# Patient Record
Sex: MALE | Race: WHITE | NOT HISPANIC OR LATINO | Employment: OTHER | URBAN - NONMETROPOLITAN AREA
[De-identification: names, ages, dates, MRNs, and addresses within clinical notes are randomized per-mention and may not be internally consistent; named-entity substitution may affect disease eponyms.]

---

## 2017-06-13 ENCOUNTER — OFFICE VISIT (OUTPATIENT)
Dept: PULMONOLOGY | Facility: CLINIC | Age: 42
End: 2017-06-13

## 2017-06-13 DIAGNOSIS — R06.02 SOB (SHORTNESS OF BREATH): Primary | ICD-10-CM

## 2017-06-13 PROCEDURE — 94060 EVALUATION OF WHEEZING: CPT | Performed by: INTERNAL MEDICINE

## 2018-03-27 ENCOUNTER — OFFICE VISIT (OUTPATIENT)
Dept: OTOLARYNGOLOGY | Facility: CLINIC | Age: 43
End: 2018-03-27

## 2018-03-27 ENCOUNTER — CONSULT (OUTPATIENT)
Dept: SLEEP MEDICINE | Facility: HOSPITAL | Age: 43
End: 2018-03-27

## 2018-03-27 VITALS — BODY MASS INDEX: 28.61 KG/M2 | HEIGHT: 72 IN | WEIGHT: 211.2 LBS

## 2018-03-27 VITALS
WEIGHT: 210 LBS | BODY MASS INDEX: 28.44 KG/M2 | OXYGEN SATURATION: 98 % | SYSTOLIC BLOOD PRESSURE: 122 MMHG | DIASTOLIC BLOOD PRESSURE: 72 MMHG | HEIGHT: 72 IN | HEART RATE: 69 BPM

## 2018-03-27 DIAGNOSIS — K21.9 GASTROESOPHAGEAL REFLUX DISEASE, ESOPHAGITIS PRESENCE NOT SPECIFIED: ICD-10-CM

## 2018-03-27 DIAGNOSIS — R07.0 THROAT PAIN IN ADULT: Primary | ICD-10-CM

## 2018-03-27 DIAGNOSIS — G47.9 SLEEP DISTURBANCE: ICD-10-CM

## 2018-03-27 DIAGNOSIS — G47.33 OBSTRUCTIVE SLEEP APNEA, ADULT: Primary | ICD-10-CM

## 2018-03-27 DIAGNOSIS — J31.0 CHRONIC RHINITIS, UNSPECIFIED TYPE: ICD-10-CM

## 2018-03-27 DIAGNOSIS — J37.0 CHRONIC LARYNGITIS: ICD-10-CM

## 2018-03-27 DIAGNOSIS — G47.9 SLEEP DISTURBANCE: Primary | ICD-10-CM

## 2018-03-27 PROCEDURE — 99243 OFF/OP CNSLTJ NEW/EST LOW 30: CPT | Performed by: OTOLARYNGOLOGY

## 2018-03-27 PROCEDURE — 31575 DIAGNOSTIC LARYNGOSCOPY: CPT | Performed by: OTOLARYNGOLOGY

## 2018-03-27 PROCEDURE — 99204 OFFICE O/P NEW MOD 45 MIN: CPT | Performed by: INTERNAL MEDICINE

## 2018-03-27 NOTE — PROGRESS NOTES
"New Patient Sleep Medicine Consultation    Encounter Date: 3/27/2018         Patient's PCP: AMILCAR Montoya  Referring provider: No ref. provider found  Reason for consultation: snoring, witnessed apneas and excessive daytime sleepiness    Kenneth Acuna is a 43 y.o. male who presents with above complaints for many years. He admits to daytime fatigue,snoring, HTN, decreased libido, irritability, memory loss, nocturia, sleepy driving, and non-restorative sleep. His bedtime is ~ 2200. He  falls asleep after 1 minutes, and is up 3-5 times per night. He wakes up ~ 0500. He drinks 1 cups of coffee, 32 teas, and 0 sodas per day. He drinks 0 alcoholic beverages per week. He does not smoke. He denies abnormal dreams, cataplexy, sleep paralysis, or hypnagogic hallucinations. He does not take sedatives or hypnotics. He has some sleepiness with driving. He naps when unstimulated. Restless legs symptoms are nightly    [place] - 19    Past Medical History:   Diagnosis Date   • High blood pressure        Social History     Social History   • Marital status:      Spouse name: N/A   • Number of children: N/A   • Years of education: N/A     Occupational History   • Not on file.     Social History Main Topics   • Smoking status: Never Smoker   • Smokeless tobacco: Never Used   • Alcohol use Not on file   • Drug use: Unknown   • Sexual activity: Not on file     Other Topics Concern   • Not on file     Social History Narrative   • No narrative on file     No family history on file.  , 2 kids, lays tile  Smoking history: smoked never  FH of sleep disorders: none    Review of Systems:  Pertinent items are noted in HPI. Patient advised to discuss any positive ROS with PCP.      Vitals:    03/27/18 1443   BP: 122/72   Pulse: 69   SpO2: 98%     Body mass index is 28.48 kg/m². Discussed the patient's BMI with him. BMI is within normal parameters. No follow-up required.  Neck circumference: 18\"            " General: Alert. Cooperative. Well developed. No acute distress.             Head:  Normocephalic. Symmetrical. Atraumatic.              Eyes: Sclera clear. No icterus. PERRLA. Normal EOM.             Ears: No deformities. Normal hearing.             Nose: No septal deviation. No drainage.          Throat: No oral lesions. No thrush. Moist mucous membranes.    Tongue is enlarged    Dentition is internally rotated teeth, overbite       Pharynx: Posterior pharyngeal pillars are narrow    Mallampati score of IV (only hard palate visible)    Pharynx is normal with unrermarkable tonsils   Chest Wall:  Normal shape. Symmetric expansion with respiration. No tenderness.             Neck:  Trachea midline.           Lungs:  Clear to auscultation bilaterally. No wheezes. No rhonchi. No rales. Respirations regular, even and unlabored.            Heart:  Regular rhythm and normal rate. Normal S1 and S2. No murmur.     Abdomen:  Soft, non-tender and non-distended. Normal bowel sounds. No masses.  Extremities:  Moves all extremities well. No edema.           Pulses: Pulses palpable and equal bilaterally.               Skin: Dry. Intact. No bleeding. No rash.           Neuro: Moves all 4 extremities and cranial nerves grossly intact.  Psychiatric: Normal mood and affect.        Current Outpatient Prescriptions:   •  Fluticasone Furoate-Vilanterol (BREO ELLIPTA) 200-25 MCG/INH aerosol powder , , Disp: , Rfl:   •  lisinopril (PRINIVIL,ZESTRIL) 5 MG tablet, , Disp: , Rfl:   •  tamsulosin (FLOMAX) 0.4 MG capsule 24 hr capsule, , Disp: , Rfl:   •  Testosterone 30 MG/ACT solution, , Disp: , Rfl:   •  valACYclovir (VALTREX) 500 MG tablet, , Disp: , Rfl:     ASSESSMENT:  1. Obstructive sleep apnea   1. Check HST  2. RTC in 3 months       This document has been electronically signed by Reyes Viveros MD on March 27, 2018         CC: AMILCAR Montoya          No ref. provider found

## 2018-03-29 NOTE — PROGRESS NOTES
"Subjective   Kenneth Acuna is a 43 y.o. male.   This is a consultation from Nel FITZGERALD  History of Present Illness   Patient is here today with throat complaints.  States that he has throat pain on a regular basis.  Says that he always feels like he is \"choked\".  Notes a raw/\"bloody\" taste in his throat all day long.  Clears his throat frequently.  States he has acid reflux symptoms on a daily basis.  Has been prescribed omeprazole but does not take this daily.  Also has snoring with possible sleep apnea and was supposed to be getting a sleep study but for reasons that are not entirely clear has not yet been scheduled.  Does not have a history of recurring throat infections or documented strep throat.  Symptoms of been going on for years.  He states frequently he will just keep working rather than go to the doctor when his throat pain flares up.  Also has nasal congestion and postnasal drainage for which he uses Flonase.  He is also on lisinopril for his blood pressure.      The following portions of the patient's history were reviewed and updated as appropriate: allergies, current medications, past family history, past medical history, past social history, past surgical history and problem list.      Kenneth Acuna reports that he has never smoked. He has never used smokeless tobacco.  Patient is not a tobacco user and has not been counseled for use of tobacco products    No family history on file.  Aid for bleeding disorder  No Known Allergies      Current Outpatient Prescriptions:   •  Fluticasone Furoate-Vilanterol (BREO ELLIPTA) 200-25 MCG/INH aerosol powder , , Disp: , Rfl:   •  lisinopril (PRINIVIL,ZESTRIL) 5 MG tablet, , Disp: , Rfl:   •  tamsulosin (FLOMAX) 0.4 MG capsule 24 hr capsule, , Disp: , Rfl:   •  Testosterone 30 MG/ACT solution, , Disp: , Rfl:   •  valACYclovir (VALTREX) 500 MG tablet, , Disp: , Rfl:     Past Medical History:   Diagnosis Date   • High blood pressure  "         Review of Systems   HENT: Positive for sore throat, trouble swallowing and voice change.    Eyes:        Vision change   Respiratory: Positive for cough and choking.    Gastrointestinal:        Heartburn   Genitourinary:        Nocturia; urinary incontinence   Neurological: Positive for weakness and headaches.   All other systems reviewed and are negative.          Objective   Physical Exam  General: Well-developed well-nourished male in no acute distress.  Alert and oriented ×3. Head: Normocephalic. Face: Symmetrical strength and appearance. PERRL. EOMI. Voice:Strong. Speech:Fluent  Ears: External ears no deformity, canals no discharge, tympanic membranes intact clear and mobile bilaterally.  Nose: Nares show no discharge mass polyp or purulence.  Boggy mucosa is present.  No gross external deformity.  Septum: Midline  Oral cavity: Lips and gums without lesions.  Tongue and floor of mouth without lesions.  Parotid and submandibular ducts unobstructed.  No mucosal lesions on the buccal mucosa or vestibule of the mouth.  Pharynx: No erythema exudate mass or ulcer.  Tonsils are not significantly enlarged, 1+ to at most 2+ in size.  Neck: No lymphadenopathy.  No thyromegaly.  Trachea and larynx midline.  No masses in the parotid or submandibular glands.    Procedure Note    Pre-operative Diagnosis: Patient presents with:  Sore Throat:  throat is raw; loses voice; throat pain       Post-operative Diagnosis: Chronic laryngitis    Anesthesia: topical with xylocaine and neosynephrine    Endoscopy Type:  Flexible Laryngoscopy    Procedure Details:    The patient was placed in the sitting position.  After topical anesthesia and decongestion, the 4 mm laryngoscope was passed.  The nasal cavities, nasopharynx, oropharynx, hypopharynx, and larynx were all examined.  Vocal cords were examined during respiration and phonation.  The following findings were noted:    Findings: Previously noted nasal findings were confirmed.  Nasopharynx without mass, hypopharynx and larynx without evidence of neoplasm. Vocal cord mobility intact. There is chronic appearing edema and erythema of the laryngeal structures consistent with chronic laryngitis.    Condition:  Stable.  Patient tolerated procedure well.    Complications:  None      Assessment/Plan   Kenneth was seen today for sore throat.    Diagnoses and all orders for this visit:    Throat pain in adult    Chronic laryngitis    Gastroesophageal reflux disease, esophagitis presence not specified    Sleep disturbance    Chronic rhinitis, unspecified type        Plan:: Explained to the patient that I believe he would benefit from daily treatment of his acid reflux, a change in his blood pressure medicine from lisinopril to a non-ACE inhibitor drug, and a consultation from the sleep lab.  He initially expresses displeasure that I will not just perform a tonsillectomy on him.  I explained to him that in my opinion tonsillectomy will not address the majority of his symptoms and that he would likely still have significant trouble with his throat after tonsillectomy.  I offered him the opportunity for another opinion from Dr. Campos or a different otolaryngologist elsewhere.  Eventually he agrees to the treatment plan including a referral to Dr. Viveros for further evaluation and treatment of his presumed sleep disorder.  He is to uses omeprazole daily, and discussed changing his blood pressure medicine with his primary care provider.  I'll reevaluate him in 2 months    My thanks to Ms. Bingham for this consultation

## 2018-03-30 ENCOUNTER — TELEPHONE (OUTPATIENT)
Dept: OTOLARYNGOLOGY | Facility: CLINIC | Age: 43
End: 2018-03-30

## 2018-03-30 RX ORDER — OMEPRAZOLE 20 MG/1
20 TABLET, DELAYED RELEASE ORAL DAILY
Qty: 30 TABLET | Refills: 0 | Status: SHIPPED | OUTPATIENT
Start: 2018-03-30

## 2018-03-30 NOTE — TELEPHONE ENCOUNTER
----- Message from Sandie Mcarthur sent at 3/30/2018  2:19 PM CDT -----  Contact: [phone]  THOUGHT DR SNYDER WAS GOING TO SEND AN RX TO [facility] FOR SOMETHING FOR REFLUX BUT NOTHING IS THERE.  I LOOKED AT CHART AND LOOKS LIKE HE MENTIONED PRILOSEC.  HE ASKED IF YOU COULD WRITE HIM A RX FOR THAT OR DOES HE NEED TO USE OTC.

## 2018-03-30 NOTE — TELEPHONE ENCOUNTER
Per Dr. Campos, Prilosec 20mg daily was sent to [facility]arm[facility].  Patient was asked to follow up with Dr. Laboy at his next scheduled appt.    (Dr. Campos covering)

## 2018-04-04 DIAGNOSIS — G47.33 OSA (OBSTRUCTIVE SLEEP APNEA): Primary | ICD-10-CM

## 2018-04-05 ENCOUNTER — HOSPITAL ENCOUNTER (OUTPATIENT)
Dept: SLEEP MEDICINE | Facility: HOSPITAL | Age: 43
Discharge: HOME | End: 2018-04-05
Admitting: INTERNAL MEDICINE

## 2018-04-05 DIAGNOSIS — G47.33 OSA (OBSTRUCTIVE SLEEP APNEA): ICD-10-CM

## 2018-04-05 PROCEDURE — 95806 SLEEP STUDY UNATT&RESP EFFT: CPT | Performed by: INTERNAL MEDICINE

## 2018-04-05 PROCEDURE — 95806 SLEEP STUDY UNATT&RESP EFFT: CPT

## 2018-04-17 ENCOUNTER — TELEPHONE (OUTPATIENT)
Dept: SLEEP MEDICINE | Facility: HOSPITAL | Age: 43
End: 2018-04-17

## 2018-04-17 NOTE — TELEPHONE ENCOUNTER
Patient called and given the results of his recent Home Sleep Study.  Pt stated that the results are inaccurate according to he and his wife.  He stated that his wife said he did not sleep or breathe like he normally does and he stated that he did not sleep much that night.  Appointment made with Dr Viveros to consider in lab sleep testing.

## 2019-03-27 ENCOUNTER — HOSPITAL ENCOUNTER (OUTPATIENT)
Dept: ULTRASOUND IMAGING | Facility: HOSPITAL | Age: 44
Discharge: HOME | End: 2019-03-27
Admitting: NURSE PRACTITIONER

## 2019-03-27 DIAGNOSIS — R10.9 ABDOMINAL DISCOMFORT: ICD-10-CM

## 2019-03-27 PROCEDURE — 76700 US EXAM ABDOM COMPLETE: CPT

## 2019-11-15 ENCOUNTER — OFFICE VISIT (OUTPATIENT)
Dept: GASTROENTEROLOGY | Facility: CLINIC | Age: 44
End: 2019-11-15

## 2019-11-15 VITALS
DIASTOLIC BLOOD PRESSURE: 78 MMHG | HEART RATE: 72 BPM | BODY MASS INDEX: 29.12 KG/M2 | WEIGHT: 208 LBS | HEIGHT: 71 IN | SYSTOLIC BLOOD PRESSURE: 126 MMHG

## 2019-11-15 DIAGNOSIS — R19.4 CHANGE IN BOWEL HABITS: ICD-10-CM

## 2019-11-15 DIAGNOSIS — R10.32 LEFT LOWER QUADRANT ABDOMINAL PAIN: ICD-10-CM

## 2019-11-15 DIAGNOSIS — R63.4 WEIGHT LOSS, ABNORMAL: Primary | ICD-10-CM

## 2019-11-15 PROCEDURE — 99204 OFFICE O/P NEW MOD 45 MIN: CPT | Performed by: INTERNAL MEDICINE

## 2019-11-15 RX ORDER — DEXTROSE AND SODIUM CHLORIDE 5; .45 G/100ML; G/100ML
30 INJECTION, SOLUTION INTRAVENOUS CONTINUOUS PRN
Status: CANCELLED | OUTPATIENT
Start: 2019-11-20

## 2019-11-15 RX ORDER — NAPROXEN 500 MG/1
500 TABLET ORAL EVERY 12 HOURS PRN
Refills: 0 | COMMUNITY
Start: 2019-10-16

## 2019-11-15 RX ORDER — VALACYCLOVIR HYDROCHLORIDE 1 G/1
TABLET, FILM COATED ORAL AS NEEDED
Refills: 0 | COMMUNITY
Start: 2019-08-15

## 2019-11-15 RX ORDER — ATORVASTATIN CALCIUM 20 MG/1
20 TABLET, FILM COATED ORAL DAILY
Refills: 0 | COMMUNITY
Start: 2019-10-16

## 2019-11-15 RX ORDER — SODIUM, POTASSIUM,MAG SULFATES 17.5-3.13G
1 SOLUTION, RECONSTITUTED, ORAL ORAL EVERY 12 HOURS
Qty: 1 BOTTLE | Refills: 0 | Status: ON HOLD | OUTPATIENT
Start: 2019-11-15 | End: 2019-11-20

## 2019-11-15 RX ORDER — QUETIAPINE FUMARATE 25 MG/1
25 TABLET, FILM COATED ORAL
Refills: 1 | COMMUNITY
Start: 2019-10-23

## 2019-11-15 RX ORDER — PANTOPRAZOLE SODIUM 40 MG/1
40 TABLET, DELAYED RELEASE ORAL DAILY
Refills: 0 | COMMUNITY
Start: 2019-10-18

## 2019-11-15 NOTE — PROGRESS NOTES
[facility]      Chief Complaint:   Chief Complaint   Patient presents with   • Rectal Bleeding     Blood in stool, weight loss       Subjective     HPI:   Patient with significant weight loss over the past 6 months.  Patient has some changes in his bowel habits with thinning of the stool.  Patient states he does get some diffuse abdominal pain mostly in the lower regions of his abdomen.  Patient does not know his family history because he was adopted.  Patient denies any nausea or vomiting.    Plan; we will schedule patient for EGD and colonoscopy to evaluate.  EGD to evaluate small bowel with the biopsy of the small bowel.    Past Medical History:   Past Medical History:   Diagnosis Date   • High blood pressure        Past Surgical History:  Past Surgical History:   Procedure Laterality Date   • WISDOM TOOTH EXTRACTION         Family History:  History reviewed. No pertinent family history.    Social History:   reports that he has never smoked. He has never used smokeless tobacco. He reports that he drinks alcohol.    Medications:   Prior to Admission medications    Medication Sig Start Date End Date Taking? Authorizing Provider   atorvastatin (LIPITOR) 20 MG tablet Take 20 mg by mouth Daily. 10/16/19  Yes Provider, MD Carlton   Fluticasone Furoate-Vilanterol (BREO ELLIPTA) 200-25 MCG/INH aerosol powder   9/7/17  Yes Emergency, Nurse Bharati RN   lisinopril (PRINIVIL,ZESTRIL) 5 MG tablet  7/11/17  Yes Emergency, Nurse Calabrese RN   pantoprazole (PROTONIX) 40 MG EC tablet Take 40 mg by mouth Daily. 10/18/19  Yes Provider, MD Carlton   QUEtiapine (SEROquel) 25 MG tablet Take 25 mg by mouth every night at bedtime. 10/23/19  Yes Provider, MD Carlton   tamsulosin (FLOMAX) 0.4 MG capsule 24 hr capsule  9/5/17  Yes Emergency, Nurse Bharati RN   Testosterone 30 MG/ACT solution  7/17/17  Yes Emergency, Nurse Epic, RN   naproxen (NAPROSYN) 500 MG tablet Take 500 mg by mouth Every 12 (Twelve)  "Hours As Needed. 10/16/19   ProviderCarlton MD   omeprazole OTC (PriLOSEC OTC) 20 MG EC tablet Take 1 tablet by mouth Daily. 3/30/18   Dandre Campos MD   sodium-potassium-magnesium sulfates (SUPREP) 17.5-3.13-1.6 GM/177ML solution oral solution Take 1 bottle by mouth Every 12 (Twelve) Hours. 11/15/19   Mayank Chi MD   valACYclovir (VALTREX) 1000 MG tablet As Needed. 8/15/19   ProviderCarlton MD   valACYclovir (VALTREX) 500 MG tablet  7/16/17   Emergency, Nurse Bharati, RN       Allergies:  Patient has no known allergies.    ROS:    Review of Systems   Constitutional: Negative for activity change, appetite change and unexpected weight change.   HENT: Negative for congestion, sore throat and trouble swallowing.    Respiratory: Negative for cough, choking and shortness of breath.    Cardiovascular: Negative for chest pain.   Gastrointestinal: Positive for abdominal pain and diarrhea. Negative for abdominal distention, anal bleeding, blood in stool, constipation, nausea, rectal pain and vomiting.   Endocrine: Negative for heat intolerance, polydipsia and polyphagia.   Genitourinary: Negative for difficulty urinating.   Musculoskeletal: Negative for arthralgias.   Skin: Negative for color change, pallor, rash and wound.   Allergic/Immunologic: Negative for food allergies.   Neurological: Negative for dizziness, syncope, weakness and headaches.   Psychiatric/Behavioral: Negative for agitation, behavioral problems, confusion and decreased concentration.     Objective     Blood pressure 126/78, pulse 72, height 180.3 cm (71\"), weight 94.3 kg (208 lb).    Physical Exam   Constitutional: He is oriented to person, place, and time. He appears well-developed and well-nourished. No distress.   HENT:   Head: Normocephalic and atraumatic.   Cardiovascular: Normal rate, regular rhythm, normal heart sounds and intact distal pulses. Exam reveals no gallop and no friction rub.   No murmur heard.  Pulmonary/Chest: " Breath sounds normal. No respiratory distress. He has no wheezes. He has no rales. He exhibits no tenderness.   Abdominal: Soft. Bowel sounds are normal. He exhibits no distension and no mass. There is no tenderness. There is no rebound and no guarding. No hernia.   Musculoskeletal: Normal range of motion. He exhibits no edema.   Neurological: He is alert and oriented to person, place, and time.   Skin: Skin is warm and dry. No rash noted. He is not diaphoretic. No erythema. No pallor.   Psychiatric: He has a normal mood and affect. His behavior is normal. Judgment and thought content normal.        Assessment/Plan   Kenneth was seen today for rectal bleeding.    Diagnoses and all orders for this visit:    Weight loss, abnormal  -     Case Request; Standing  -     dextrose 5 % and sodium chloride 0.45 % infusion  -     Case Request    Change in bowel habits  -     Case Request; Standing  -     dextrose 5 % and sodium chloride 0.45 % infusion  -     Case Request    Left lower quadrant abdominal pain  -     Case Request; Standing  -     dextrose 5 % and sodium chloride 0.45 % infusion  -     Case Request    Other orders  -     Follow Anesthesia Guidelines / Standing Orders; Future  -     Obtain Informed Consent; Future  -     Implement Anesthesia Orders Day of Procedure; Standing  -     Obtain Informed Consent; Standing  -     POC Glucose Once; Standing  -     Insert Peripheral IV; Standing  -     sodium-potassium-magnesium sulfates (SUPREP) 17.5-3.13-1.6 GM/177ML solution oral solution; Take 1 bottle by mouth Every 12 (Twelve) Hours.        ESOPHAGOGASTRODUODENOSCOPY (N/A), COLONOSCOPY (N/A)     Diagnosis Plan   1. Weight loss, abnormal  Case Request    dextrose 5 % and sodium chloride 0.45 % infusion    Case Request   2. Change in bowel habits  Case Request    dextrose 5 % and sodium chloride 0.45 % infusion    Case Request   3. Left lower quadrant abdominal pain  Case Request    dextrose 5 % and sodium chloride  0.45 % infusion    Case Request       Anticipated Surgical Procedure:  Orders Placed This Encounter   Procedures   • Follow Anesthesia Guidelines / Standing Orders     Standing Status:   Future   • Obtain Informed Consent     Standing Status:   Future     Order Specific Question:   Informed Consent Given For     Answer:   egd and colonoscopy       The risks, benefits, and alternatives of this procedure have been discussed with the patient or the responsible party- the patient understands and agrees to proceed.

## 2019-11-15 NOTE — PATIENT INSTRUCTIONS

## 2019-11-20 ENCOUNTER — ANESTHESIA (OUTPATIENT)
Dept: GASTROENTEROLOGY | Facility: HOSPITAL | Age: 44
End: 2019-11-20

## 2019-11-20 ENCOUNTER — HOSPITAL ENCOUNTER (OUTPATIENT)
Facility: HOSPITAL | Age: 44
Setting detail: HOSPITAL OUTPATIENT SURGERY
Discharge: HOME | End: 2019-11-20
Attending: INTERNAL MEDICINE | Admitting: INTERNAL MEDICINE

## 2019-11-20 ENCOUNTER — ANESTHESIA EVENT (OUTPATIENT)
Dept: GASTROENTEROLOGY | Facility: HOSPITAL | Age: 44
End: 2019-11-20

## 2019-11-20 VITALS
SYSTOLIC BLOOD PRESSURE: 110 MMHG | OXYGEN SATURATION: 98 % | HEART RATE: 54 BPM | DIASTOLIC BLOOD PRESSURE: 64 MMHG | HEIGHT: 71 IN | BODY MASS INDEX: 27.86 KG/M2 | RESPIRATION RATE: 18 BRPM | WEIGHT: 199 LBS | TEMPERATURE: 96.9 F

## 2019-11-20 DIAGNOSIS — R19.4 CHANGE IN BOWEL HABITS: ICD-10-CM

## 2019-11-20 DIAGNOSIS — R10.32 LEFT LOWER QUADRANT ABDOMINAL PAIN: ICD-10-CM

## 2019-11-20 DIAGNOSIS — R63.4 WEIGHT LOSS, ABNORMAL: ICD-10-CM

## 2019-11-20 PROCEDURE — 45380 COLONOSCOPY AND BIOPSY: CPT | Performed by: INTERNAL MEDICINE

## 2019-11-20 PROCEDURE — 43239 EGD BIOPSY SINGLE/MULTIPLE: CPT | Performed by: INTERNAL MEDICINE

## 2019-11-20 PROCEDURE — 88305 TISSUE EXAM BY PATHOLOGIST: CPT | Performed by: PATHOLOGY

## 2019-11-20 PROCEDURE — 88305 TISSUE EXAM BY PATHOLOGIST: CPT | Performed by: INTERNAL MEDICINE

## 2019-11-20 PROCEDURE — 25010000002 PROPOFOL 10 MG/ML EMULSION: Performed by: NURSE ANESTHETIST, CERTIFIED REGISTERED

## 2019-11-20 RX ORDER — LIDOCAINE HYDROCHLORIDE 20 MG/ML
INJECTION, SOLUTION INTRAVENOUS AS NEEDED
Status: DISCONTINUED | OUTPATIENT
Start: 2019-11-20 | End: 2019-11-20 | Stop reason: SURG

## 2019-11-20 RX ORDER — PROPOFOL 10 MG/ML
VIAL (ML) INTRAVENOUS AS NEEDED
Status: DISCONTINUED | OUTPATIENT
Start: 2019-11-20 | End: 2019-11-20 | Stop reason: SURG

## 2019-11-20 RX ORDER — DEXTROSE AND SODIUM CHLORIDE 5; .45 G/100ML; G/100ML
30 INJECTION, SOLUTION INTRAVENOUS CONTINUOUS PRN
Status: DISCONTINUED | OUTPATIENT
Start: 2019-11-20 | End: 2019-11-20 | Stop reason: HOSPADM

## 2019-11-20 RX ADMIN — LIDOCAINE HYDROCHLORIDE 100 MG: 20 INJECTION, SOLUTION INTRAVENOUS at 15:22

## 2019-11-20 RX ADMIN — PROPOFOL 20 MG: 10 INJECTION, EMULSION INTRAVENOUS at 15:25

## 2019-11-20 RX ADMIN — PROPOFOL 20 MG: 10 INJECTION, EMULSION INTRAVENOUS at 15:26

## 2019-11-20 RX ADMIN — PROPOFOL 30 MG: 10 INJECTION, EMULSION INTRAVENOUS at 15:29

## 2019-11-20 RX ADMIN — PROPOFOL 30 MG: 10 INJECTION, EMULSION INTRAVENOUS at 15:24

## 2019-11-20 RX ADMIN — PROPOFOL 20 MG: 10 INJECTION, EMULSION INTRAVENOUS at 15:31

## 2019-11-20 RX ADMIN — PROPOFOL 100 MG: 10 INJECTION, EMULSION INTRAVENOUS at 15:23

## 2019-11-20 RX ADMIN — DEXTROSE AND SODIUM CHLORIDE 30 ML/HR: 5; 450 INJECTION, SOLUTION INTRAVENOUS at 14:47

## 2019-11-20 NOTE — ANESTHESIA PREPROCEDURE EVALUATION
Anesthesia Evaluation     Patient summary reviewed   NPO Solid Status: > 8 hours  NPO Liquid Status: > 8 hours           Airway   Mallampati: III  TM distance: <3 FB  Neck ROM: full  Possible difficult intubation  Dental      Pulmonary - normal exam   Cardiovascular - normal exam    (+) hypertension,       Neuro/Psych  GI/Hepatic/Renal/Endo      Musculoskeletal     Abdominal    Substance History      OB/GYN          Other                        Anesthesia Plan    ASA 2     MAC     intravenous induction     Anesthetic plan, all risks, benefits, and alternatives have been provided, discussed and informed consent has been obtained with: patient.

## 2019-11-20 NOTE — ANESTHESIA POSTPROCEDURE EVALUATION
Patient: Kenneth Acuna    Procedure Summary     Date:  11/20/19 Room / Location:  [facility] ENDOSCOPY 1 / [facility] ENDOSCOPY    Anesthesia Start:  1518 Anesthesia Stop:  1534    Procedures:       ESOPHAGOGASTRODUODENOSCOPY (N/A )      COLONOSCOPY (N/A ) Diagnosis:       Weight loss, abnormal      Change in bowel habits      Left lower quadrant abdominal pain      (Weight loss, abnormal [R63.4])      (Change in bowel habits [R19.4])      (Left lower quadrant abdominal pain [R10.32])    Surgeon:  Mayank Chi MD Provider:  Miguelito Heath CRNA    Anesthesia Type:  MAC ASA Status:  2          Anesthesia Type: MAC  Last vitals  BP   167/80 (11/20/19 1431)   Temp   97.6 °F (36.4 °C) (11/20/19 1431)   Pulse   63 (11/20/19 1431)   Resp   18 (11/20/19 1431)     SpO2   99 % (11/20/19 1431)     Post Anesthesia Care and Evaluation    Patient location during evaluation: bedside  Patient participation: complete - patient participated  Level of consciousness: awake  Pain score: 0  Pain management: adequate  Airway patency: patent  Anesthetic complications: No anesthetic complications  PONV Status: none  Cardiovascular status: acceptable  Respiratory status: acceptable  Hydration status: acceptable

## 2019-11-22 LAB
LAB AP CASE REPORT: NORMAL
PATH REPORT.FINAL DX SPEC: NORMAL
PATH REPORT.GROSS SPEC: NORMAL

## 2019-12-10 ENCOUNTER — OFFICE VISIT (OUTPATIENT)
Dept: GASTROENTEROLOGY | Facility: CLINIC | Age: 44
End: 2019-12-10

## 2019-12-10 VITALS
HEIGHT: 71 IN | WEIGHT: 205.8 LBS | HEART RATE: 63 BPM | OXYGEN SATURATION: 99 % | BODY MASS INDEX: 28.81 KG/M2 | SYSTOLIC BLOOD PRESSURE: 130 MMHG | DIASTOLIC BLOOD PRESSURE: 80 MMHG

## 2019-12-10 DIAGNOSIS — R19.4 CHANGE IN BOWEL HABITS: ICD-10-CM

## 2019-12-10 DIAGNOSIS — R63.4 WEIGHT LOSS, ABNORMAL: Primary | ICD-10-CM

## 2019-12-10 PROCEDURE — 99213 OFFICE O/P EST LOW 20 MIN: CPT | Performed by: INTERNAL MEDICINE

## 2019-12-10 NOTE — PROGRESS NOTES
[facility] ([facility]      Chief Complaint:   Chief Complaint   Patient presents with   • Weight loss, abnormal       Subjective     HPI:   Patient complains of abnormal weight loss.  Patient had EGD and colonoscopy which only showed mild gastritis otherwise normal.  Patient states that since the procedure he is actually gained about 6 pounds.  Biopsies of small bowel were normal.  Discussed with patient that I do not find any reason for his weight loss at this time and most likely is just a normal fluctuation in his weight.  We will follow-up in 3 months to make sure he does not continue to have weight loss.  No further studies needed at this time.    Plan; we will have patient follow-up in 3 months    Past Medical History:   Past Medical History:   Diagnosis Date   • High blood pressure        Past Surgical History:  Past Surgical History:   Procedure Laterality Date   • COLONOSCOPY N/A 11/20/2019    Procedure: COLONOSCOPY;  Surgeon: Mayank Chi MD;  Location: [facility] ENDOSCOPY;  Service: Gastroenterology   • ENDOSCOPY N/A 11/20/2019    Procedure: ESOPHAGOGASTRODUODENOSCOPY;  Surgeon: Mayank Chi MD;  Location: [facility] ENDOSCOPY;  Service: Gastroenterology   • WISDOM TOOTH EXTRACTION         Family History:  History reviewed. No pertinent family history.    Social History:   reports that he has never smoked. He has never used smokeless tobacco. He reports that he drinks alcohol.    Medications:   Prior to Admission medications    Medication Sig Start Date End Date Taking? Authorizing Provider   atorvastatin (LIPITOR) 20 MG tablet Take 20 mg by mouth Daily. 10/16/19  Yes Provider, MD Carlton   Fluticasone Furoate-Vilanterol (BREO ELLIPTA) 200-25 MCG/INH aerosol powder   9/7/17  Yes Emergency, Nurse Bharati RN   lisinopril (PRINIVIL,ZESTRIL) 5 MG tablet  7/11/17  Yes Emergency, Nurse Epic, RN   naproxen (NAPROSYN) 500 MG tablet Take 500 mg by mouth Every 12 (Twelve) Hours As Needed. 10/16/19   "Yes Provider, MD Carlton   omeprazole OTC (PriLOSEC OTC) 20 MG EC tablet Take 1 tablet by mouth Daily. 3/30/18  Yes Dandre Campos MD   pantoprazole (PROTONIX) 40 MG EC tablet Take 40 mg by mouth Daily. 10/18/19  Yes Carlton Sahni MD   QUEtiapine (SEROquel) 25 MG tablet Take 25 mg by mouth every night at bedtime. 10/23/19  Yes Carlton Sahni MD   tamsulosin (FLOMAX) 0.4 MG capsule 24 hr capsule  9/5/17  Yes Emergency, Nurse Bharati, RN   Testosterone 30 MG/ACT solution  7/17/17  Yes Emergency, Nurse Bharati, RN   valACYclovir (VALTREX) 1000 MG tablet As Needed. 8/15/19  Yes Carlton Sahni MD   valACYclovir (VALTREX) 500 MG tablet  7/16/17  Yes Emergency, Nurse Bharati, RN       Allergies:  Patient has no known allergies.    ROS:    Review of Systems   Constitutional: Negative for activity change, appetite change and unexpected weight change.   HENT: Negative for congestion, sore throat and trouble swallowing.    Respiratory: Negative for cough, choking and shortness of breath.    Cardiovascular: Negative for chest pain.   Gastrointestinal: Negative for abdominal distention, abdominal pain, anal bleeding, blood in stool, constipation, diarrhea, nausea, rectal pain and vomiting.   Endocrine: Negative for heat intolerance, polydipsia and polyphagia.   Genitourinary: Negative for difficulty urinating.   Musculoskeletal: Negative for arthralgias.   Skin: Negative for color change, pallor, rash and wound.   Allergic/Immunologic: Negative for food allergies.   Neurological: Negative for dizziness, syncope, weakness and headaches.   Psychiatric/Behavioral: Negative for agitation, behavioral problems, confusion and decreased concentration.     Objective     Blood pressure 130/80, pulse 63, height 180.3 cm (71\"), weight 93.4 kg (205 lb 12.8 oz), SpO2 99 %.    Physical Exam   Constitutional: He is oriented to person, place, and time. He appears well-developed and well-nourished. No distress.   HENT:   Head: " Normocephalic and atraumatic.   Cardiovascular: Normal rate, regular rhythm, normal heart sounds and intact distal pulses. Exam reveals no gallop and no friction rub.   No murmur heard.  Pulmonary/Chest: Breath sounds normal. No respiratory distress. He has no wheezes. He has no rales. He exhibits no tenderness.   Abdominal: Soft. Bowel sounds are normal. He exhibits no distension and no mass. There is no tenderness. There is no rebound and no guarding. No hernia.   Musculoskeletal: Normal range of motion. He exhibits no edema.   Neurological: He is alert and oriented to person, place, and time.   Skin: Skin is warm and dry. No rash noted. He is not diaphoretic. No erythema. No pallor.   Psychiatric: He has a normal mood and affect. His behavior is normal. Judgment and thought content normal.        Assessment/Plan   Kenneth was seen today for weight loss, abnormal.    Diagnoses and all orders for this visit:    Weight loss, abnormal    Change in bowel habits        * Surgery not found *     Diagnosis Plan   1. Weight loss, abnormal     2. Change in bowel habits         Anticipated Surgical Procedure:  No orders of the defined types were placed in this encounter.      The risks, benefits, and alternatives of this procedure have been discussed with the patient or the responsible party- the patient understands and agrees to proceed.

## 2019-12-10 NOTE — PATIENT INSTRUCTIONS

## 2020-10-23 ENCOUNTER — OFFICE VISIT (OUTPATIENT)
Dept: ORTHOPEDIC SURGERY | Facility: CLINIC | Age: 45
End: 2020-10-23

## 2020-10-23 ENCOUNTER — LAB (OUTPATIENT)
Dept: LAB | Facility: HOSPITAL | Age: 45
End: 2020-10-23

## 2020-10-23 VITALS
TEMPERATURE: 98.2 F | OXYGEN SATURATION: 96 % | DIASTOLIC BLOOD PRESSURE: 93 MMHG | HEIGHT: 72 IN | SYSTOLIC BLOOD PRESSURE: 135 MMHG | BODY MASS INDEX: 23.3 KG/M2 | WEIGHT: 172 LBS | HEART RATE: 72 BPM

## 2020-10-23 DIAGNOSIS — L03.116 CELLULITIS OF LEFT KNEE: ICD-10-CM

## 2020-10-23 DIAGNOSIS — M25.561 CHRONIC PAIN OF RIGHT KNEE: Primary | ICD-10-CM

## 2020-10-23 DIAGNOSIS — G89.29 CHRONIC PAIN OF LEFT KNEE: Primary | ICD-10-CM

## 2020-10-23 DIAGNOSIS — G89.29 CHRONIC PAIN OF RIGHT KNEE: Primary | ICD-10-CM

## 2020-10-23 DIAGNOSIS — M25.562 CHRONIC PAIN OF LEFT KNEE: Primary | ICD-10-CM

## 2020-10-23 PROCEDURE — 87186 SC STD MICRODIL/AGAR DIL: CPT | Performed by: ORTHOPAEDIC SURGERY

## 2020-10-23 PROCEDURE — 99203 OFFICE O/P NEW LOW 30 MIN: CPT | Performed by: ORTHOPAEDIC SURGERY

## 2020-10-23 PROCEDURE — 87205 SMEAR GRAM STAIN: CPT | Performed by: ORTHOPAEDIC SURGERY

## 2020-10-23 PROCEDURE — 20610 DRAIN/INJ JOINT/BURSA W/O US: CPT | Performed by: ORTHOPAEDIC SURGERY

## 2020-10-23 PROCEDURE — 87147 CULTURE TYPE IMMUNOLOGIC: CPT | Performed by: ORTHOPAEDIC SURGERY

## 2020-10-23 PROCEDURE — 87070 CULTURE OTHR SPECIMN AEROBIC: CPT | Performed by: ORTHOPAEDIC SURGERY

## 2020-10-23 RX ORDER — HYDROCODONE BITARTRATE AND ACETAMINOPHEN 7.5; 325 MG/1; MG/1
TABLET ORAL
Qty: 30 TABLET | Refills: 0 | Status: SHIPPED | OUTPATIENT
Start: 2020-10-23 | End: 2020-10-26

## 2020-10-23 RX ORDER — CIPROFLOXACIN 500 MG/1
500 TABLET, FILM COATED ORAL EVERY 12 HOURS SCHEDULED
Status: SHIPPED | OUTPATIENT
Start: 2020-10-23 | End: 2020-10-28

## 2020-10-23 NOTE — PROGRESS NOTES
Kenneth Acuna is a 45 y.o. male   Primary provider:  Siobhan Ortiz MD       Chief Complaint   Patient presents with   • Left Knee - Knee Pain       HISTORY OF PRESENT ILLNESS:  Left knee pain started 10/15/2020 was seen at [place] on 10/18/2020 was started on IV for cellulitis.     This is the first office visit for evaluation of left knee pain.    Mr. Hannah is 45 years old.  He is considered to be a fair historian.  He said that about a week ago he had what he thought was a pimple on his left knee.  He said he tried to squeeze the pimple.  He subsequently developed pain and swelling.  He was seen in the [place] and by his history he was started on IV antibiotics 5 days ago.  His last dose of antibiotics was yesterday.  He has continued moderate pain.  The pain is fairly diffuse worse over the anterior aspect of the knee.  There is been no drainage.  Is unclear if he has had problems with his knee in the past.  He said that in the past he has drained his knee himself.  He said his son is a free bleeder and he apparently uses needles that are for his son's use.  Complains of moderately severe pain not controlled with over-the-counter medications.    Home medications include Breo Ellipta lisinopril Naprosyn omeprazole Protonix Seroquel Flomax testosterone and Lipitor.  He has no drug allergies.  He does not smoke.  Past medical history is remarkable for diabetes and hypertension.  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  He is .      Knee Pain   There was no injury mechanism. The pain is present in the left knee. The quality of the pain is described as stabbing. The pain is at a severity of 10/10. The pain is severe. The pain has been constant since onset. Associated symptoms comments: REDNESS, BRUISING, SWELLING. . He reports no foreign bodies present. The symptoms are aggravated by weight bearing (STANDING, SITTING, DRIVING, AND WALKING. ). He has tried rest for the symptoms.        CONCURRENT  MEDICAL HISTORY:    Past Medical History:   Diagnosis Date   • High blood pressure        No Known Allergies      Current Outpatient Medications:   •  atorvastatin (LIPITOR) 20 MG tablet, Take 20 mg by mouth Daily., Disp: , Rfl: 0  •  Fluticasone Furoate-Vilanterol (BREO ELLIPTA) 200-25 MCG/INH aerosol powder , , Disp: , Rfl:   •  lisinopril (PRINIVIL,ZESTRIL) 5 MG tablet, , Disp: , Rfl:   •  naproxen (NAPROSYN) 500 MG tablet, Take 500 mg by mouth Every 12 (Twelve) Hours As Needed., Disp: , Rfl: 0  •  omeprazole OTC (PriLOSEC OTC) 20 MG EC tablet, Take 1 tablet by mouth Daily., Disp: 30 tablet, Rfl: 0  •  pantoprazole (PROTONIX) 40 MG EC tablet, Take 40 mg by mouth Daily., Disp: , Rfl: 0  •  QUEtiapine (SEROquel) 25 MG tablet, Take 25 mg by mouth every night at bedtime., Disp: , Rfl: 1  •  tamsulosin (FLOMAX) 0.4 MG capsule 24 hr capsule, , Disp: , Rfl:   •  Testosterone 30 MG/ACT solution, , Disp: , Rfl:   •  valACYclovir (VALTREX) 1000 MG tablet, As Needed., Disp: , Rfl: 0  •  valACYclovir (VALTREX) 500 MG tablet, , Disp: , Rfl:   •  HYDROcodone-acetaminophen (NORCO) 7.5-325 MG per tablet, 1-2 by mouth 3 times a day as needed for severe pain, Disp: 30 tablet, Rfl: 0    Current Facility-Administered Medications:   •  ciprofloxacin (CIPRO) tablet 500 mg, 500 mg, Oral, Q12H, Chuck Medrano MD    Past Surgical History:   Procedure Laterality Date   • COLONOSCOPY N/A 11/20/2019    Procedure: COLONOSCOPY;  Surgeon: Mayank Chi MD;  Location: [facility] ENDOSCOPY;  Service: Gastroenterology   • ENDOSCOPY N/A 11/20/2019    Procedure: ESOPHAGOGASTRODUODENOSCOPY;  Surgeon: Mayank Chi MD;  Location: [facility] ENDOSCOPY;  Service: Gastroenterology   • WISDOM TOOTH EXTRACTION         History reviewed. No pertinent family history.    Social History     Socioeconomic History   • Marital status:      Spouse name: Not on file   • Number of children: Not on file   • Years of education: Not on file   • Highest  "education level: Not on file   Tobacco Use   • Smoking status: Never Smoker   • Smokeless tobacco: Never Used   Substance and Sexual Activity   • Alcohol use: Yes     Comment: occasionally        Review of Systems   Musculoskeletal:        Stiffness.    Psychiatric/Behavioral: The patient is nervous/anxious.    All other systems reviewed and are negative.    reView of systems is positive as noted above.  PHYSICAL EXAMINATION:       Vitals:    10/23/20 1014   BP: 135/93   Pulse: 72   Temp: 98.2 °F (36.8 °C)   SpO2: 96%   Weight: 78 kg (172 lb)   Height: 182.9 cm (72\")   PainSc: 10-Worst pain ever       Physical Exam general he is alert and in apparent mild to moderate discomfort at rest.  He is occasionally almost agitated when describing his pain.    GAIT:     []  Normal  [x]  Antalgic    Assistive device: []  None  []  Walker     []  Crutches  []  Cane     [x]  Wheelchair  []  Stretcher    Ortho Exam walks with a moderately antalgic gait favoring the left.  There is fullness over the anterosuperior aspect of the knee with erythema primarily over the kim medial l aspect of the knee and distal thigh.  There is palpable fluid subcutaneous tissues over the anterolateral aspect of the knee.  Motion of the knee is quite restricted with complaints of pain.  He will allow motion from about 20 to 90 degrees.  Dorsalis pedis pulses strong.  Sensory exam is intact to soft touch.  There is no instability in varus and valgus stress.  He may have a small effusion of the knee but this is difficult to quantitate because of his pain and apprehension.  The erythema appears to be well localized to the skin and subcutaneous tissues over the kim medial aspect of the knee.    Xr Knee 1 Or 2 View Left    Result Date: 10/23/2020  Ordering Provider:  Chuck Medrano MD Ordering Diagnosis/Indication:  Chronic pain of left knee Procedure:  XR KNEE 1 OR 2 VW LEFT Exam Date:  10/23/20 COMPARISON: None      Radiographs of the left " knee AP and lateral views done today show prominent anterior soft tissue swelling.  There is mild spurring of the patella.  There is also some suggestion of mild spurring of the medial femoral condyle. Chuck Medrano MD 10/23/20    Potential benefits of aspiration were discussed.  He wished to proceed with this.    The knee was prepped.  Skin was infiltrated with 1% Xylocaine with epinephrine.  The fullness over the anteromedial aspect of the distal thigh knee was aspirated of 3 to 4 cc of sanguinous purulent fluid.  The space was injected with 3 cc of Xylocaine with epinephrine.  He tolerated this well.      Xr Knee 1 Or 2 View Left    Result Date: 10/23/2020  Narrative: Ordering Provider:  Chuck Medrano MD Ordering Diagnosis/Indication:  Chronic pain of left knee Procedure:  XR KNEE 1 OR 2 VW LEFT Exam Date:  10/23/20 COMPARISON: None     Impression:  Radiographs of the left knee AP and lateral views done today show prominent anterior soft tissue swelling.  There is mild spurring of the patella.  There is also some suggestion of mild spurring of the medial femoral condyle. Chuck Medrano MD 10/23/20    Large Joint Arthrocentesis: L knee  Date/Time: 10/23/2020 10:50 AM  Consent given by: patient  Site marked: site marked  Timeout: Immediately prior to procedure a time out was called to verify the correct patient, procedure, equipment, support staff and site/side marked as required   Supporting Documentation  Indications: pain and joint swelling   Procedure Details  Location: knee - L knee  Preparation: Patient was prepped and draped in the usual sterile fashion  Needle size: 22 G  Approach: anteromedial  Analysis: fluid sample sent for laboratory analysis  Patient tolerance: patient tolerated the procedure well with no immediate complications            ASSESSMENT: Infected prepatellar bursitis.  His fluid was submitted to the lab for culture and Gram stain as well as cell count.  He was given a  prescription for Cipro.  He was instructed in activity restriction.  It sounds like he has been trying to be active despite his symptoms.  I asked that he can find himself to home until his symptoms significantly improved.  He understands that incision and drainage may eventually be needed.]    He requested pain medication.  He was given a prescription for hydrocodone 7.5mg to take 1 or 2 as needed for pain.  Alexandre was interrogated and I felt it was appropriate to provide him short-term controlled substances.    Return to see me in 4 days.  If his symptoms worsen in the meantime he will call for earlier evaluation.    Diagnoses and all orders for this visit:    Chronic pain of left knee  -     XR Knee 1 or 2 View Left  -     Large Joint Arthrocentesis: L knee  -     Cancel: Culture, Routine - Swab, Knee, Left  -     Cancel: Body Fluid Cell Count With Differential - Aspirate, Knee, Left  -     Cancel: Body fluid cell count - Aspirate, Knee, Left    Cellulitis of left knee  -     Large Joint Arthrocentesis: L knee  -     Cancel: Culture, Routine - Swab, Knee, Left  -     Cancel: Body Fluid Cell Count With Differential - Aspirate, Knee, Left  -     Cancel: Body fluid cell count - Aspirate, Knee, Left  -     HYDROcodone-acetaminophen (NORCO) 7.5-325 MG per tablet; 1-2 by mouth 3 times a day as needed for severe pain  -     ciprofloxacin (CIPRO) tablet 500 mg          PLAN    Return in about 4 days (around 10/27/2020).    Chuck Medrano MD

## 2020-10-25 LAB
BACTERIA SPEC AEROBE CULT: ABNORMAL
GRAM STN SPEC: ABNORMAL
GRAM STN SPEC: ABNORMAL

## 2020-10-25 RX ORDER — CLINDAMYCIN HYDROCHLORIDE 300 MG/1
300 CAPSULE ORAL 3 TIMES DAILY
Qty: 30 CAPSULE | Refills: 0 | Status: SHIPPED | OUTPATIENT
Start: 2020-10-25

## 2020-10-26 ENCOUNTER — OFFICE VISIT (OUTPATIENT)
Dept: ORTHOPEDIC SURGERY | Facility: CLINIC | Age: 45
End: 2020-10-26

## 2020-10-26 VITALS — BODY MASS INDEX: 23.3 KG/M2 | WEIGHT: 172 LBS | HEIGHT: 72 IN

## 2020-10-26 DIAGNOSIS — L03.116 CELLULITIS OF LEFT KNEE: Primary | ICD-10-CM

## 2020-10-26 PROCEDURE — 10140 I&D HMTMA SEROMA/FLUID COLLJ: CPT | Performed by: ORTHOPAEDIC SURGERY

## 2020-10-26 PROCEDURE — 99213 OFFICE O/P EST LOW 20 MIN: CPT | Performed by: ORTHOPAEDIC SURGERY

## 2020-10-26 RX ORDER — HYDROCODONE BITARTRATE AND ACETAMINOPHEN 7.5; 325 MG/1; MG/1
TABLET ORAL
Qty: 20 TABLET | Refills: 0 | Status: SHIPPED | OUTPATIENT
Start: 2020-10-26

## 2020-10-26 NOTE — PROGRESS NOTES
"Kenneth Acuna is a 45 y.o. male returns for     Chief Complaint   Patient presents with   • Left Knee - Follow-up, Pain       HISTORY OF PRESENT ILLNESS: Mr. Hannah has had continued moderate to severe pain.  He said he has been taking 7-9 hydrocodone daily and has 9 pills left.  Cultures have grown out methicillin-resistant Staphylococcus sensitive to clindamycin.  I spoke with him yesterday and called in a prescription for clindamycin.  He said he filled that prescription has been and has begun taking his medication.  I asked him to come in today for probable incision and drainage.       CONCURRENT MEDICAL HISTORY:    The following portions of the patient's history were reviewed and updated as appropriate: allergies, current medications, past family history, past medical history, past social history, past surgical history and problem list.         PHYSICAL EXAMINATION:       Ht 182.9 cm (72\")   Wt 78 kg (172 lb)   BMI 23.33 kg/m²     Physical Exam he is alert and in apparent mild discomfort at rest.    GAIT:     []  Normal  [x]  Antalgic    Assistive device: [x]  None  []  Walker     []  Crutches  []  Cane     []  Wheelchair  []  Stretcher    Ortho Exam he walks with a moderate limp.  There is moderate tenderness diffusely over the anteromedial aspect of the knee.  He appears to have a small effusion of the knee.  However his warmth and erythema is fairly well localized to the anteromedial aspect of the distal thigh and knee.    Recommend an incision and drainage.  He was agreeable with this.    The timeout procedure was performed.  The skin and subcutaneous tissues over the anteromedial aspect of the distal thigh infiltrated with a mixture of Marcaine and Xylocaine with epinephrine.  Satisfactory anesthesia was obtained.    The limb was prepped and drapes applied.  A transverse incision about 2 cm in length was made over the anteromedial aspect of the distal thigh.  The abscess cavity was then entered and a " moderate amount of sanguinous purulent material obtained.  The space was irrigated with saline.  A small Penrose drain was placed in the depths of the space and sutured in place with interrupted nylon suture.  A bulky soft dressing was applied followed by Ace wrap from the toes to mid thigh.  He tolerated the procedure well.    Xr Knee 1 Or 2 View Left    Result Date: 10/23/2020  Narrative: Ordering Provider:  Chuck Medrano MD Ordering Diagnosis/Indication:  Chronic pain of left knee Procedure:  XR KNEE 1 OR 2 VW LEFT Exam Date:  10/23/20 COMPARISON: None     Impression:  Radiographs of the left knee AP and lateral views done today show prominent anterior soft tissue swelling.  There is mild spurring of the patella.  There is also some suggestion of mild spurring of the medial femoral condyle. Chuck Medrano MD 10/23/20            ASSESSMENT: Infected prepatellar bursitis.  He was instructed in activity restriction to help control his pain.  He will also continue his current antibiotics.  In 2 days he may begin dressing changes.    Return to see me in 4 days for wound check.    He was given a prescription for 20 hydrocodone 7.5 mg each to take 1-2 3 times a day as needed for pain.    Diagnoses and all orders for this visit:    Cellulitis of left knee  -     HYDROcodone-acetaminophen (NORCO) 7.5-325 MG per tablet; 1-2 orally 3 times a day as needed for moderate pain.          PLAN        Patient's Body mass index is 23.33 kg/m². BMI is within normal parameters. No follow-up required..mi    Return in about 4 days (around 10/30/2020).    Chuck Medrano MD

## 2020-10-30 ENCOUNTER — OFFICE VISIT (OUTPATIENT)
Dept: ORTHOPEDIC SURGERY | Facility: CLINIC | Age: 45
End: 2020-10-30

## 2020-10-30 VITALS — WEIGHT: 176.5 LBS | BODY MASS INDEX: 23.91 KG/M2 | HEIGHT: 72 IN | HEART RATE: 76 BPM | OXYGEN SATURATION: 98 %

## 2020-10-30 DIAGNOSIS — L03.116 CELLULITIS OF LEFT KNEE: Primary | ICD-10-CM

## 2020-10-30 DIAGNOSIS — M25.562 CHRONIC PAIN OF LEFT KNEE: ICD-10-CM

## 2020-10-30 DIAGNOSIS — G89.29 CHRONIC PAIN OF LEFT KNEE: ICD-10-CM

## 2020-10-30 PROCEDURE — 99024 POSTOP FOLLOW-UP VISIT: CPT | Performed by: ORTHOPAEDIC SURGERY

## 2020-10-30 NOTE — PROGRESS NOTES
"Kenneth Acuna is a 45 y.o. male returns for     Chief Complaint   Patient presents with   • Left Knee - Follow-up   • Wound Check       HISTORY OF PRESENT ILLNESS: Patient being seen for left knee follow up and wound check.  Scribbles has had improvement in his pain.  Despite my previous instructions he has not changed his dressing since 28 October.  He said he has been taking the clindamycin I prescribed him 5 days ago.  His cultures showed a scant growth of methicillin-resistant staph.       CONCURRENT MEDICAL HISTORY:    The following portions of the patient's history were reviewed and updated as appropriate: allergies, current medications, past family history, past medical history, past social history, past surgical history and problem list.         PHYSICAL EXAMINATION:       Pulse 76   Ht 182.9 cm (72\")   Wt 80.1 kg (176 lb 8 oz)   SpO2 98%   BMI 23.94 kg/m²     Physical Exam he walks with a slightly antalgic gait favoring the left.  His dressings were removed.  Dressings were dry with mild serous staining.  There was no active drainage from the wound.  His drain is intact.  There appears to be a trace amount of fluid in the subcutaneous tissues distal to his drain.  There is no cellulitis.  He lacks 5 to 10 degrees of full active extension of the knee with complaints of pain.    GAIT:     []  Normal  [x]  Antalgic    Assistive device: [x]  None  []  Walker     []  Crutches  []  Cane     []  Wheelchair  []  Stretcher    Ortho Exam      Xr Knee 1 Or 2 View Left    Result Date: 10/23/2020  Narrative: Ordering Provider:  Chuck Medrano MD Ordering Diagnosis/Indication:  Chronic pain of left knee Procedure:  XR KNEE 1 OR 2 VW LEFT Exam Date:  10/23/20 COMPARISON: None     Impression:  Radiographs of the left knee AP and lateral views done today show prominent anterior soft tissue swelling.  There is mild spurring of the patella.  There is also some suggestion of mild spurring of the medial femoral " condyle. Chuck Medrano MD 10/23/20            ASSESSMENT: Resolving prepatellar bursitis.  He was told to complete his course of antibiotics.  He was also instructed in daily dressing changes.  He was also encouraged in active and passive extension of the knee.    Return here in 4 days for drain removal.    Diagnoses and all orders for this visit:    Cellulitis of left knee    Chronic pain of left knee          PLAN    Patient's Body mass index is 23.94 kg/m². BMI is within normal parameters. No follow-up required..      Return in about 4 days (around 11/3/2020).    Chuck Medrano MD

## 2020-11-03 ENCOUNTER — OFFICE VISIT (OUTPATIENT)
Dept: ORTHOPEDIC SURGERY | Facility: CLINIC | Age: 45
End: 2020-11-03

## 2020-11-03 VITALS
HEIGHT: 72 IN | OXYGEN SATURATION: 97 % | WEIGHT: 176 LBS | TEMPERATURE: 98.4 F | BODY MASS INDEX: 23.84 KG/M2 | HEART RATE: 76 BPM

## 2020-11-03 DIAGNOSIS — M25.562 CHRONIC PAIN OF LEFT KNEE: ICD-10-CM

## 2020-11-03 DIAGNOSIS — L03.116 CELLULITIS OF LEFT KNEE: Primary | ICD-10-CM

## 2020-11-03 DIAGNOSIS — G89.29 CHRONIC PAIN OF LEFT KNEE: ICD-10-CM

## 2020-11-03 PROCEDURE — 99213 OFFICE O/P EST LOW 20 MIN: CPT | Performed by: ORTHOPAEDIC SURGERY

## 2020-11-03 NOTE — PROGRESS NOTES
"Kenneth Acuna is a 45 y.o. male returns for     Chief Complaint   Patient presents with   • Left Knee - Follow-up   • Wound Check       HISTORY OF PRESENT ILLNESS: Mr. Acuna has had slow improvement in his pain.       CONCURRENT MEDICAL HISTORY:    The following portions of the patient's history were reviewed and updated as appropriate: allergies, current medications, past family history, past medical history, past social history, past surgical history and problem list.         PHYSICAL EXAMINATION:       Pulse 76   Temp 98.4 °F (36.9 °C)   Ht 182.9 cm (72\")   Wt 79.8 kg (176 lb)   SpO2 97%   BMI 23.87 kg/m²     Physical Exam    GAIT:     [x]  Normal  []  Antalgic    Assistive device: []  None  []  Walker     []  Crutches  []  Cane     []  Wheelchair  []  Stretcher    Ortho Exam there is trace serous staining of his dressings.  There is no active drainage from his wound.  His drain was removed.  There was no palpable fluid in the subcutaneous tissues.  The erythema of the anterior knee continues to improve.    Xr Knee 1 Or 2 View Left    Result Date: 10/23/2020  Narrative: Ordering Provider:  Chuck Medrano MD Ordering Diagnosis/Indication:  Chronic pain of left knee Procedure:  XR KNEE 1 OR 2 VW LEFT Exam Date:  10/23/20 COMPARISON: None     Impression:  Radiographs of the left knee AP and lateral views done today show prominent anterior soft tissue swelling.  There is mild spurring of the patella.  There is also some suggestion of mild spurring of the medial femoral condyle. Chuck Medrano MD 10/23/20            ASSESSMENT: Resolving prepatellar bursitis.  He was encouraged to complete his course of antibiotics.  He was instructed in symptomatic care.    Return here in 2 weeks if his symptoms have not steadily improved.    Diagnoses and all orders for this visit:    Cellulitis of left knee    Chronic pain of left knee          PLAN    Return in about 2 weeks (around 11/17/2020).    Chuck" Jana Medrano MD

## 2020-11-10 ENCOUNTER — OFFICE VISIT (OUTPATIENT)
Dept: ORTHOPEDIC SURGERY | Facility: CLINIC | Age: 45
End: 2020-11-10

## 2020-11-10 VITALS — BODY MASS INDEX: 25.76 KG/M2 | OXYGEN SATURATION: 98 % | HEIGHT: 72 IN | WEIGHT: 190.2 LBS | HEART RATE: 81 BPM

## 2020-11-10 DIAGNOSIS — M25.562 CHRONIC PAIN OF LEFT KNEE: ICD-10-CM

## 2020-11-10 DIAGNOSIS — G89.29 CHRONIC PAIN OF LEFT KNEE: ICD-10-CM

## 2020-11-10 DIAGNOSIS — L03.116 CELLULITIS OF LEFT KNEE: Primary | ICD-10-CM

## 2020-11-10 PROCEDURE — 20610 DRAIN/INJ JOINT/BURSA W/O US: CPT | Performed by: ORTHOPAEDIC SURGERY

## 2020-11-10 PROCEDURE — 99213 OFFICE O/P EST LOW 20 MIN: CPT | Performed by: ORTHOPAEDIC SURGERY

## 2020-11-10 NOTE — PROGRESS NOTES
"Kenneth Acuna is a 45 y.o. male returns for     Chief Complaint   Patient presents with   • Left Knee - Follow-up, Pain       HISTORY OF PRESENT ILLNESS:Follow up left knee pain.  Pain scale today 6/10    Mr. Acuna has had continued pain and swelling in his left knee.  He says the swelling will resolve at night and then is worse with activities.  He has been fairly active despite his symptoms.  He reports anxiety over not being able to do his work.       CONCURRENT MEDICAL HISTORY:    The following portions of the patient's history were reviewed and updated as appropriate: allergies, current medications, past family history, past medical history, past social history, past surgical history and problem list.         PHYSICAL EXAMINATION:       Pulse 81   Ht 182.9 cm (72\")   Wt 86.3 kg (190 lb 3.2 oz)   SpO2 98%   BMI 25.80 kg/m²     Physical Exam he is alert and in no apparent distress at rest.    GAIT:     []  Normal  [x]  Antalgic    Assistive device: [x]  None  []  Walker     []  Crutches  []  Cane     []  Wheelchair  []  Stretcher    Ortho Exam walks with a faint limp favoring the left.  There is firm edema over the anteromedial aspect of the distal thigh and knee at the area of his previous incision and drainage.  There is what appears to be a small amount of fluid in the anterior soft tissues extending distally from his incision and standing over the anterior aspect of the patellar tendon.  There was no warmth erythema about the knee.  There was no detectable effusion of the knee itself.  There was no cellulitis.    The benefits of aspiration were discussed.  He wished to proceed with this.    The knee was prepped.  Skin was infiltrated with 1% Xylocaine with epinephrine.  The needle was advanced into the subcutaneous tissues over the anteromedial aspect of the proximal leg just medial to the patellar tendon.  I was able to obtain a scant amount of bloody fluid.  The space was then injected with a " mixture of Marcaine and Xylocaine with epinephrine.  There were no complications.      Xr Knee 1 Or 2 View Left    Result Date: 10/23/2020  Narrative: Ordering Provider:  Chuck Medrano MD Ordering Diagnosis/Indication:  Chronic pain of left knee Procedure:  XR KNEE 1 OR 2 VW LEFT Exam Date:  10/23/20 COMPARISON: None     Impression:  Radiographs of the left knee AP and lateral views done today show prominent anterior soft tissue swelling.  There is mild spurring of the patella.  There is also some suggestion of mild spurring of the medial femoral condyle. Chuck Medrano MD 10/23/20            ASSESSMENT: Resolving prepatellar bursitis.  I find no evidence of residual infection.    The natural history of the disorder was again discussed.  He understands it would likely take some time before he has full resolution of his symptoms.  He was again instructed in symptomatic care.    Return here in 9 to 10 days for clinical exam.      Diagnoses and all orders for this visit:    Cellulitis of left knee    Chronic pain of left knee    Other orders  -     Large Joint Arthrocentesis          PLAN    Aspiration left knee  Date/Time: 11/10/2020 8:51 AM  Consent given by: patient  Site marked: site marked  Timeout: Immediately prior to procedure a time out was called to verify the correct patient, procedure, equipment, support staff and site/side marked as required   Supporting Documentation  Indications: pain   Procedure Details  Location: knee - L knee  Preparation: Patient was prepped and draped in the usual sterile fashion  Needle size: 18 G  Approach: anterolateral  Aspirate amount: 0.5 mL  Aspirate: clear  Patient tolerance: patient tolerated the procedure well with no immediate complications              Patient's Body mass index is 25.8 kg/m². BMI is above normal parameters. Recommendations include: exercise counseling and nutrition counseling.    No follow-ups on file.    Josephine Smith, [facility]

## 2020-11-11 ENCOUNTER — TELEPHONE (OUTPATIENT)
Dept: ORTHOPEDIC SURGERY | Facility: CLINIC | Age: 45
End: 2020-11-11

## 2020-11-11 NOTE — TELEPHONE ENCOUNTER
DR TAVERAS.  PATIENT CALLED ASKING ABOUT AN ANTIBIOTIC?  HE HAD BEEN ON CLINDOMYACIN.  HE SAYS HIS KNEE IS STILL SWOLLEN AND IS STINGING WHERE THE DRAIN HAD BEEN.    No antibiotics are needed at this time

## (undated) DEVICE — CANN SMPL SOFTECH BIFLO ETCO2 A/M 7FT

## (undated) DEVICE — SINGLE-USE BIOPSY FORCEPS: Brand: RADIAL JAW 4

## (undated) DEVICE — BITEBLOCK ENDO W/STRAP 60F A/ LF DISP